# Patient Record
Sex: FEMALE | Race: BLACK OR AFRICAN AMERICAN | NOT HISPANIC OR LATINO | Employment: UNEMPLOYED | URBAN - METROPOLITAN AREA
[De-identification: names, ages, dates, MRNs, and addresses within clinical notes are randomized per-mention and may not be internally consistent; named-entity substitution may affect disease eponyms.]

---

## 2018-02-10 PROCEDURE — 81025 URINE PREGNANCY TEST: CPT | Performed by: EMERGENCY MEDICINE

## 2018-02-10 PROCEDURE — 99283 EMERGENCY DEPT VISIT LOW MDM: CPT | Mod: 25

## 2018-02-10 PROCEDURE — 82962 GLUCOSE BLOOD TEST: CPT

## 2018-02-11 ENCOUNTER — HOSPITAL ENCOUNTER (EMERGENCY)
Facility: HOSPITAL | Age: 28
Discharge: HOME | End: 2018-02-11
Attending: EMERGENCY MEDICINE
Payer: MEDICAID

## 2018-02-11 VITALS
BODY MASS INDEX: 43.87 KG/M2 | RESPIRATION RATE: 18 BRPM | OXYGEN SATURATION: 100 % | WEIGHT: 273 LBS | TEMPERATURE: 98 F | HEIGHT: 66 IN | HEART RATE: 78 BPM | SYSTOLIC BLOOD PRESSURE: 126 MMHG | DIASTOLIC BLOOD PRESSURE: 68 MMHG

## 2018-02-11 DIAGNOSIS — G57.93 NEUROPATHY OF BOTH FEET: Primary | ICD-10-CM

## 2018-02-11 LAB
B-HCG UR QL: NEGATIVE
CTP QC/QA: YES
POCT GLUCOSE: 100 MG/DL (ref 70–110)

## 2018-02-11 RX ORDER — ETODOLAC 400 MG/1
400 TABLET, FILM COATED ORAL 2 TIMES DAILY
COMMUNITY
End: 2019-09-05

## 2018-02-11 NOTE — ED TRIAGE NOTES
Numbness and tingling x 1 day to bilateral feet.  Was seen in urgent care in [facility] and was discharged home.  Pulses present bilaterally with no edema noted.  Now weakness noted at this time.  Denies any trauma to feet or history legs.  No acute distress noted at this time.

## 2018-02-11 NOTE — DISCHARGE INSTRUCTIONS
Your symptoms are consistent with peripheral neuropathy of your feet.  Your blood sugar was normal in the emergency room so it is unlikely that this is due to diabetes.  Because this is more likely ER recent increased standing at work.  Use the medications that he were prescribed at your other emergency room visits to manage her symptoms.  Rest and elevate your legs.  It may take some time for his symptoms to improve.  Return to emergency room for worsening numbness, weakness with movement of her legs, severe back pain or any new or worsening symptoms.  It is very important to make an appointment to see her primary physician as soon as possible to monitor your symptoms and do a general checkup.

## 2018-02-11 NOTE — ED PROVIDER NOTES
Encounter Date: 2/10/2018    SCRIBE #1 NOTE: I, Johnie Ortiz LEROY, am scribing for, and in the presence of,  Sherine Soliz MD. I have scribed the following portions of the note - Other sections scribed: HPI, ROS, PE.       History     Chief Complaint   Patient presents with    Numbness     went to [facility] urgent care today because her feet are swollen and they get numb when increase in swelling; + pedal pulses, +1 edema bilateral lower extremities     CC: Numbness     HPI: This 27 y.o. female with gallstones presents to the ED c/o acute onset, numbness with swellingx3 days.  Patient denies specific injury, but does report doing a lot of standing at her job.  Pt reports she went to [facility]gent [facility] p[facility] and states she was given anti inflammatory medication. Pt reports she was dx as borderline diabetic one month ago. Pt denies prior tx. No alleviating or exacerbating factors. Pt denies weakness, back pain, dysuria, and myalgias.         The history is provided by the patient. No  was used.     Review of patient's allergies indicates:  No Known Allergies  Past Medical History:   Diagnosis Date    Gall stones      Past Surgical History:   Procedure Laterality Date    CHOLECYSTECTOMY       Family History   Problem Relation Age of Onset    Diabetes Paternal Grandfather     Hypertension Paternal Grandfather      Social History   Substance Use Topics    Smoking status: Never Smoker    Smokeless tobacco: Never Used    Alcohol use Yes      Comment: Occassionally       Review of Systems   Constitutional: Negative for activity change, appetite change, chills, diaphoresis and fever.   HENT: Negative for congestion, drooling, ear pain, mouth sores, rhinorrhea, sinus pain, sore throat and trouble swallowing.    Eyes: Negative for pain and discharge.   Respiratory: Negative for cough, chest tightness, shortness of breath, wheezing and stridor.    Cardiovascular: Negative for chest pain, palpitations and leg  swelling.   Gastrointestinal: Negative for abdominal distention, abdominal pain, blood in stool, constipation, diarrhea, nausea and vomiting.   Genitourinary: Positive for frequency. Negative for difficulty urinating, dysuria, flank pain, hematuria and urgency.   Musculoskeletal: Negative for arthralgias, back pain and myalgias.   Skin: Negative for pallor, rash and wound.   Neurological: Positive for numbness. Negative for dizziness, syncope, weakness and light-headedness.   All other systems reviewed and are negative.      Physical Exam     Initial Vitals [02/10/18 2354]   BP Pulse Resp Temp SpO2   122/71 65 16 98.1 °F (36.7 °C) 99 %      MAP       88         Physical Exam    Nursing note and vitals reviewed.  Constitutional: She appears well-developed and well-nourished. She is cooperative.  Non-toxic appearance. No distress.   HENT:   Head: Normocephalic and atraumatic.   Mouth/Throat: Oropharynx is clear and moist.   Eyes: Conjunctivae and EOM are normal. Pupils are equal, round, and reactive to light.   Neck: Normal range of motion and full passive range of motion without pain. Neck supple. No thyromegaly present. No JVD present.   Cardiovascular: Normal rate, regular rhythm, normal heart sounds and normal pulses.   Strong palpable pulses   Pulmonary/Chest: Effort normal and breath sounds normal. No respiratory distress.   Abdominal: Soft. Normal appearance and bowel sounds are normal. She exhibits no distension and no mass. There is no tenderness.   Musculoskeletal: Normal range of motion.   5/5 EHL, FHL, TAGSC   Neurological: She is alert and oriented to person, place, and time. She has normal strength. No cranial nerve deficit or sensory deficit.   Sensation intact to light touch throughout   Skin: Skin is warm, dry and intact. No rash noted. No erythema.   Psychiatric: She has a normal mood and affect. Her speech is normal and behavior is normal. Judgment and thought content normal.         ED Course    Procedures  Labs Reviewed   POCT URINE PREGNANCY   POCT GLUCOSE             Medical Decision Making:   Initial Assessment:   Bilateral foot swelling and tingling in the setting increased activity and prolonged standing  Differential Diagnosis:   Neuropathy  Peripheral edema  Muscle strain  Radicular pain  Low clinical suspicion of DVT given lack of unilateral swelling or calf tenderness.  ED Management:  Hent afebrile, no acute distress.  Bilateral feet are minimally swollen, there is no pitting edema.  Patient has strongly palpable DP and PT pulses.  Patient has no neurological deficits and her sensation is intact to light touch throughout.  Patient reports being evaluated for the same symptoms just prior to arrival and received new prescription for anti-inflammatory medication which she has not yet tried.  Discussed extensively with patient and her mother concerning symptoms for which return to ER for and the necessity of trauma course of anti-inflammatory medication and activity modification to determine the need for operative intervention.  No evidence of cellulitis or fracture.  She is stable for discharge to follow up with primary physician.            Scribe Attestation:   Scribe #1: I performed the above scribed service and the documentation accurately describes the services I performed. I attest to the accuracy of the note.    Attending Attestation:           Physician Attestation for Scribe:  Physician Attestation Statement for Scribe #1: I, Sherine Soliz MD, reviewed documentation, as scribed by Johnie Ortiz II in my presence, and it is both accurate and complete.                    Clinical Impression:   The encounter diagnosis was Neuropathy of both feet.    Disposition:   Disposition: Discharged  Condition: Stable                        Sherine Sloiz MD  02/17/18 0229

## 2019-09-05 PROBLEM — G35 MS (MULTIPLE SCLEROSIS): Status: ACTIVE | Noted: 2019-07-01

## 2019-09-05 PROBLEM — S42.009A BROKEN CLAVICLE: Status: ACTIVE | Noted: 2019-09-05

## 2019-09-25 PROBLEM — B00.9 HERPES SIMPLEX VIRUS (HSV) INFECTION: Status: ACTIVE | Noted: 2019-09-25

## 2020-03-31 ENCOUNTER — ANESTHESIA (OUTPATIENT)
Dept: OBSTETRICS AND GYNECOLOGY | Facility: HOSPITAL | Age: 30
End: 2020-03-31
Payer: MEDICAID

## 2020-03-31 ENCOUNTER — ANESTHESIA EVENT (OUTPATIENT)
Dept: OBSTETRICS AND GYNECOLOGY | Facility: HOSPITAL | Age: 30
End: 2020-03-31
Payer: MEDICAID

## 2020-03-31 ENCOUNTER — HOSPITAL ENCOUNTER (INPATIENT)
Facility: HOSPITAL | Age: 30
LOS: 2 days | Discharge: HOME | End: 2020-04-02
Attending: OBSTETRICS & GYNECOLOGY | Admitting: OBSTETRICS & GYNECOLOGY
Payer: MEDICAID

## 2020-03-31 DIAGNOSIS — B00.9 HERPES SIMPLEX VIRUS (HSV) INFECTION: ICD-10-CM

## 2020-03-31 DIAGNOSIS — G35 MS (MULTIPLE SCLEROSIS): ICD-10-CM

## 2020-03-31 DIAGNOSIS — Z34.90 PREGNANCY: Primary | ICD-10-CM

## 2020-03-31 DIAGNOSIS — O99.820 GBS (GROUP B STREPTOCOCCUS CARRIER), +RV CULTURE, CURRENTLY PREGNANT: ICD-10-CM

## 2020-03-31 PROBLEM — S42.009A BROKEN CLAVICLE: Status: RESOLVED | Noted: 2019-09-05 | Resolved: 2020-03-31

## 2020-03-31 LAB
ABO + RH BLD: NORMAL
ALBUMIN SERPL BCP-MCNC: 2.6 G/DL (ref 3.5–5.2)
ALP SERPL-CCNC: 164 U/L (ref 55–135)
ALT SERPL W/O P-5'-P-CCNC: 11 U/L (ref 10–44)
ANION GAP SERPL CALC-SCNC: 16 MMOL/L (ref 8–16)
AST SERPL-CCNC: 22 U/L (ref 10–40)
BASOPHILS # BLD AUTO: 0.01 K/UL (ref 0–0.2)
BASOPHILS NFR BLD: 0.1 % (ref 0–1.9)
BILIRUB SERPL-MCNC: 0.9 MG/DL (ref 0.1–1)
BLD GP AB SCN CELLS X3 SERPL QL: NORMAL
BUN SERPL-MCNC: 4 MG/DL (ref 6–20)
CALCIUM SERPL-MCNC: 8.2 MG/DL (ref 8.7–10.5)
CHLORIDE SERPL-SCNC: 107 MMOL/L (ref 95–110)
CO2 SERPL-SCNC: 15 MMOL/L (ref 23–29)
CREAT SERPL-MCNC: 0.7 MG/DL (ref 0.5–1.4)
DIFFERENTIAL METHOD: ABNORMAL
EOSINOPHIL # BLD AUTO: 0 K/UL (ref 0–0.5)
EOSINOPHIL NFR BLD: 0 % (ref 0–8)
ERYTHROCYTE [DISTWIDTH] IN BLOOD BY AUTOMATED COUNT: 16.2 % (ref 11.5–14.5)
EST. GFR  (AFRICAN AMERICAN): >60 ML/MIN/1.73 M^2
EST. GFR  (NON AFRICAN AMERICAN): >60 ML/MIN/1.73 M^2
GLUCOSE SERPL-MCNC: 95 MG/DL (ref 70–110)
HCT VFR BLD AUTO: 34.3 % (ref 37–48.5)
HGB BLD-MCNC: 10.5 G/DL (ref 12–16)
IMM GRANULOCYTES # BLD AUTO: 0.05 K/UL (ref 0–0.04)
IMM GRANULOCYTES NFR BLD AUTO: 0.7 % (ref 0–0.5)
INFLUENZA A, MOLECULAR: NEGATIVE
INFLUENZA B, MOLECULAR: NEGATIVE
LACTATE SERPL-SCNC: 1.4 MMOL/L (ref 0.5–2.2)
LYMPHOCYTES # BLD AUTO: 1.5 K/UL (ref 1–4.8)
LYMPHOCYTES NFR BLD: 20.1 % (ref 18–48)
MAGNESIUM SERPL-MCNC: 1.5 MG/DL (ref 1.6–2.6)
MCH RBC QN AUTO: 24.4 PG (ref 27–31)
MCHC RBC AUTO-ENTMCNC: 30.6 G/DL (ref 32–36)
MCV RBC AUTO: 80 FL (ref 82–98)
MONOCYTES # BLD AUTO: 0.7 K/UL (ref 0.3–1)
MONOCYTES NFR BLD: 9.3 % (ref 4–15)
NEUTROPHILS # BLD AUTO: 5.2 K/UL (ref 1.8–7.7)
NEUTROPHILS NFR BLD: 69.8 % (ref 38–73)
NRBC BLD-RTO: 0 /100 WBC
PHOSPHATE SERPL-MCNC: 3.1 MG/DL (ref 2.7–4.5)
PLATELET # BLD AUTO: 218 K/UL (ref 150–350)
PMV BLD AUTO: 10.4 FL (ref 9.2–12.9)
POTASSIUM SERPL-SCNC: 3.4 MMOL/L (ref 3.5–5.1)
PROCALCITONIN SERPL IA-MCNC: 0.09 NG/ML
PROT SERPL-MCNC: 7 G/DL (ref 6–8.4)
RBC # BLD AUTO: 4.31 M/UL (ref 4–5.4)
SODIUM SERPL-SCNC: 138 MMOL/L (ref 136–145)
SPECIMEN SOURCE: NORMAL
WBC # BLD AUTO: 7.51 K/UL (ref 3.9–12.7)

## 2020-03-31 PROCEDURE — 72100002 HC LABOR CARE, 1ST 8 HOURS

## 2020-03-31 PROCEDURE — 62326 NJX INTERLAMINAR LMBR/SAC: CPT | Performed by: ANESTHESIOLOGY

## 2020-03-31 PROCEDURE — 86901 BLOOD TYPING SEROLOGIC RH(D): CPT

## 2020-03-31 PROCEDURE — 99285 EMERGENCY DEPT VISIT HI MDM: CPT | Mod: 25,27

## 2020-03-31 PROCEDURE — C1751 CATH, INF, PER/CENT/MIDLINE: HCPCS | Performed by: ANESTHESIOLOGY

## 2020-03-31 PROCEDURE — 11000001 HC ACUTE MED/SURG PRIVATE ROOM

## 2020-03-31 PROCEDURE — 84100 ASSAY OF PHOSPHORUS: CPT

## 2020-03-31 PROCEDURE — 63600175 PHARM REV CODE 636 W HCPCS: Performed by: OBSTETRICS & GYNECOLOGY

## 2020-03-31 PROCEDURE — 83605 ASSAY OF LACTIC ACID: CPT

## 2020-03-31 PROCEDURE — 36415 COLL VENOUS BLD VENIPUNCTURE: CPT

## 2020-03-31 PROCEDURE — 80053 COMPREHEN METABOLIC PANEL: CPT

## 2020-03-31 PROCEDURE — 83735 ASSAY OF MAGNESIUM: CPT

## 2020-03-31 PROCEDURE — 27200710 HC EPIDURAL INFUSION PUMP SET: Performed by: ANESTHESIOLOGY

## 2020-03-31 PROCEDURE — 87502 INFLUENZA DNA AMP PROBE: CPT

## 2020-03-31 PROCEDURE — 51701 INSERT BLADDER CATHETER: CPT

## 2020-03-31 PROCEDURE — 85025 COMPLETE CBC W/AUTO DIFF WBC: CPT

## 2020-03-31 PROCEDURE — 25000003 PHARM REV CODE 250: Performed by: OBSTETRICS & GYNECOLOGY

## 2020-03-31 PROCEDURE — 51702 INSERT TEMP BLADDER CATH: CPT

## 2020-03-31 PROCEDURE — 59409 PRA ETRICAL CARE,VAG DELIV ONLY: ICD-10-PCS | Mod: AA,,, | Performed by: ANESTHESIOLOGY

## 2020-03-31 PROCEDURE — 25000003 PHARM REV CODE 250: Performed by: ANESTHESIOLOGY

## 2020-03-31 PROCEDURE — U0002 COVID-19 LAB TEST NON-CDC: HCPCS

## 2020-03-31 PROCEDURE — 72200004 HC VAGINAL DELIVERY LEVEL I

## 2020-03-31 PROCEDURE — 99211 OFF/OP EST MAY X REQ PHY/QHP: CPT | Mod: 25

## 2020-03-31 PROCEDURE — S0020 INJECTION, BUPIVICAINE HYDRO: HCPCS | Performed by: ANESTHESIOLOGY

## 2020-03-31 PROCEDURE — 59409 OBSTETRICAL CARE: CPT | Mod: AA,,, | Performed by: ANESTHESIOLOGY

## 2020-03-31 PROCEDURE — 84145 PROCALCITONIN (PCT): CPT

## 2020-03-31 PROCEDURE — 87040 BLOOD CULTURE FOR BACTERIA: CPT

## 2020-03-31 PROCEDURE — 86592 SYPHILIS TEST NON-TREP QUAL: CPT

## 2020-03-31 RX ORDER — ONDANSETRON 8 MG/1
8 TABLET, ORALLY DISINTEGRATING ORAL EVERY 8 HOURS PRN
Status: DISCONTINUED | OUTPATIENT
Start: 2020-03-31 | End: 2020-04-02 | Stop reason: HOSPADM

## 2020-03-31 RX ORDER — FENTANYL/BUPIVACAINE/NS/PF 2MCG/ML-.1
PLASTIC BAG, INJECTION (ML) INJECTION CONTINUOUS PRN
Status: DISCONTINUED | OUTPATIENT
Start: 2020-03-31 | End: 2020-03-31

## 2020-03-31 RX ORDER — DOCUSATE SODIUM 100 MG/1
200 CAPSULE, LIQUID FILLED ORAL 2 TIMES DAILY PRN
Status: DISCONTINUED | OUTPATIENT
Start: 2020-03-31 | End: 2020-04-02 | Stop reason: HOSPADM

## 2020-03-31 RX ORDER — ACETAMINOPHEN 325 MG/1
650 TABLET ORAL EVERY 6 HOURS PRN
Status: DISCONTINUED | OUTPATIENT
Start: 2020-03-31 | End: 2020-03-31

## 2020-03-31 RX ORDER — SIMETHICONE 80 MG
1 TABLET,CHEWABLE ORAL EVERY 6 HOURS PRN
Status: DISCONTINUED | OUTPATIENT
Start: 2020-03-31 | End: 2020-04-02 | Stop reason: HOSPADM

## 2020-03-31 RX ORDER — HYDROCODONE BITARTRATE AND ACETAMINOPHEN 10; 325 MG/1; MG/1
1 TABLET ORAL EVERY 4 HOURS PRN
Status: DISCONTINUED | OUTPATIENT
Start: 2020-03-31 | End: 2020-04-02 | Stop reason: HOSPADM

## 2020-03-31 RX ORDER — ACETAMINOPHEN 325 MG/1
650 TABLET ORAL EVERY 6 HOURS PRN
Status: DISCONTINUED | OUTPATIENT
Start: 2020-03-31 | End: 2020-04-02 | Stop reason: HOSPADM

## 2020-03-31 RX ORDER — ONDANSETRON 8 MG/1
8 TABLET, ORALLY DISINTEGRATING ORAL EVERY 8 HOURS PRN
Status: DISCONTINUED | OUTPATIENT
Start: 2020-03-31 | End: 2020-03-31 | Stop reason: SDUPTHER

## 2020-03-31 RX ORDER — DIPHENHYDRAMINE HCL 25 MG
25 CAPSULE ORAL EVERY 4 HOURS PRN
Status: DISCONTINUED | OUTPATIENT
Start: 2020-03-31 | End: 2020-04-02 | Stop reason: HOSPADM

## 2020-03-31 RX ORDER — DIPHENHYDRAMINE HYDROCHLORIDE 50 MG/ML
25 INJECTION INTRAMUSCULAR; INTRAVENOUS EVERY 4 HOURS PRN
Status: DISCONTINUED | OUTPATIENT
Start: 2020-03-31 | End: 2020-04-02 | Stop reason: HOSPADM

## 2020-03-31 RX ORDER — GUAIFENESIN 600 MG/1
600 TABLET, EXTENDED RELEASE ORAL 2 TIMES DAILY
Status: DISCONTINUED | OUTPATIENT
Start: 2020-03-31 | End: 2020-03-31

## 2020-03-31 RX ORDER — OXYTOCIN/RINGER'S LACTATE 30/500 ML
334 PLASTIC BAG, INJECTION (ML) INTRAVENOUS ONCE
Status: DISCONTINUED | OUTPATIENT
Start: 2020-03-31 | End: 2020-04-02 | Stop reason: HOSPADM

## 2020-03-31 RX ORDER — HYDROCORTISONE 25 MG/G
CREAM TOPICAL 3 TIMES DAILY PRN
Status: DISCONTINUED | OUTPATIENT
Start: 2020-03-31 | End: 2020-04-02 | Stop reason: HOSPADM

## 2020-03-31 RX ORDER — PRENATAL WITH FERROUS FUM AND FOLIC ACID 3080; 920; 120; 400; 22; 1.84; 3; 20; 10; 1; 12; 200; 27; 25; 2 [IU]/1; [IU]/1; MG/1; [IU]/1; MG/1; MG/1; MG/1; MG/1; MG/1; MG/1; UG/1; MG/1; MG/1; MG/1; MG/1
1 TABLET ORAL DAILY
Status: DISCONTINUED | OUTPATIENT
Start: 2020-04-01 | End: 2020-04-02 | Stop reason: HOSPADM

## 2020-03-31 RX ORDER — BUPIVACAINE HYDROCHLORIDE 2.5 MG/ML
INJECTION, SOLUTION INFILTRATION; PERINEURAL
Status: DISCONTINUED | OUTPATIENT
Start: 2020-03-31 | End: 2020-03-31

## 2020-03-31 RX ORDER — OXYTOCIN/RINGER'S LACTATE 30/500 ML
95 PLASTIC BAG, INJECTION (ML) INTRAVENOUS ONCE
Status: DISCONTINUED | OUTPATIENT
Start: 2020-03-31 | End: 2020-04-02 | Stop reason: HOSPADM

## 2020-03-31 RX ORDER — ALBUTEROL SULFATE 90 UG/1
2 AEROSOL, METERED RESPIRATORY (INHALATION) 4 TIMES DAILY
Status: DISCONTINUED | OUTPATIENT
Start: 2020-03-31 | End: 2020-03-31

## 2020-03-31 RX ORDER — GUAIFENESIN 600 MG/1
600 TABLET, EXTENDED RELEASE ORAL 2 TIMES DAILY
Status: DISCONTINUED | OUTPATIENT
Start: 2020-03-31 | End: 2020-04-02 | Stop reason: HOSPADM

## 2020-03-31 RX ORDER — IBUPROFEN 600 MG/1
600 TABLET ORAL EVERY 6 HOURS PRN
Status: DISCONTINUED | OUTPATIENT
Start: 2020-03-31 | End: 2020-04-02 | Stop reason: HOSPADM

## 2020-03-31 RX ORDER — SODIUM CHLORIDE, SODIUM LACTATE, POTASSIUM CHLORIDE, CALCIUM CHLORIDE 600; 310; 30; 20 MG/100ML; MG/100ML; MG/100ML; MG/100ML
INJECTION, SOLUTION INTRAVENOUS CONTINUOUS
Status: DISCONTINUED | OUTPATIENT
Start: 2020-03-31 | End: 2020-03-31

## 2020-03-31 RX ORDER — HYDROCODONE BITARTRATE AND ACETAMINOPHEN 5; 325 MG/1; MG/1
1 TABLET ORAL EVERY 4 HOURS PRN
Status: DISCONTINUED | OUTPATIENT
Start: 2020-03-31 | End: 2020-04-02 | Stop reason: HOSPADM

## 2020-03-31 RX ORDER — ALBUTEROL SULFATE 90 UG/1
2 AEROSOL, METERED RESPIRATORY (INHALATION) EVERY 4 HOURS PRN
Status: DISCONTINUED | OUTPATIENT
Start: 2020-03-31 | End: 2020-04-02 | Stop reason: HOSPADM

## 2020-03-31 RX ORDER — SODIUM CHLORIDE 9 MG/ML
INJECTION, SOLUTION INTRAVENOUS
Status: DISCONTINUED | OUTPATIENT
Start: 2020-03-31 | End: 2020-03-31

## 2020-03-31 RX ADMIN — AMPICILLIN SODIUM 1 G: 1 INJECTION, POWDER, FOR SOLUTION INTRAMUSCULAR; INTRAVENOUS at 02:03

## 2020-03-31 RX ADMIN — SODIUM CHLORIDE, SODIUM LACTATE, POTASSIUM CHLORIDE, AND CALCIUM CHLORIDE 1000 ML: .6; .31; .03; .02 INJECTION, SOLUTION INTRAVENOUS at 09:03

## 2020-03-31 RX ADMIN — GUAIFENESIN 600 MG: 600 TABLET, EXTENDED RELEASE ORAL at 04:03

## 2020-03-31 RX ADMIN — BUPIVACAINE HYDROCHLORIDE 6 ML: 2.5 INJECTION, SOLUTION INFILTRATION; PERINEURAL at 10:03

## 2020-03-31 RX ADMIN — HYDROCODONE BITARTRATE AND ACETAMINOPHEN 1 TABLET: 5; 325 TABLET ORAL at 04:03

## 2020-03-31 RX ADMIN — SODIUM CHLORIDE, SODIUM LACTATE, POTASSIUM CHLORIDE, AND CALCIUM CHLORIDE: .6; .31; .03; .02 INJECTION, SOLUTION INTRAVENOUS at 10:03

## 2020-03-31 RX ADMIN — Medication 10 ML/HR: at 10:03

## 2020-03-31 RX ADMIN — AMPICILLIN SODIUM 2 G: 2 INJECTION, POWDER, FOR SOLUTION INTRAMUSCULAR; INTRAVENOUS at 09:03

## 2020-03-31 NOTE — TREATMENT PLAN
1830 S Frank RN charge nurse called and left message with pulmonology, regarding consult    1845 At bedside. In and out cath completed (see output flowsheet). pericare completed. Underwear and pad applied. Green pads changed on bed . Pt assisted back to bed. Made aware of poc to transfer to mother baby room once baby's bath is completed. Verbal recall.

## 2020-03-31 NOTE — ANESTHESIA PROCEDURE NOTES
Epidural    Patient location during procedure: OB   Reason for block: primary anesthetic   Diagnosis: IUP   Start time: 3/31/2020 10:31 AM  Timeout: 3/31/2020 10:30 AM  End time: 3/31/2020 10:48 AM    Staffing  Performing Provider: Daniel Shin MD  Authorizing Provider: Daniel Shin MD        Preanesthetic Checklist  Completed: patient identified, site marked, pre-op evaluation, timeout performed, IV checked, risks and benefits discussed, monitors and equipment checked, anesthesia consent given, hand hygiene performed and patient being monitored  Preparation  Patient position: sitting  Prep: ChloraPrep  Patient monitoring: Pulse Ox and Blood Pressure  Epidural  Skin Anesthetic: lidocaine 1%  Skin Wheal: 3 mL  Administration type: single shot  Approach: midline  Interspace: L3-4    Injection technique: REG air  Needle and Epidural Catheter  Needle type: Tuohy   Needle gauge: 17  Needle length: 3.5 inches  Needle insertion depth: 7 cm  Catheter type: end hole and springwound  Catheter size: 19 G  Catheter at skin depth: 12 cm  Test dose: 3 mL of lidocaine 1.5% with Epi 1-to-200,000  Additional Documentation: incremental injection, negative aspiration for heme and CSF, no paresthesia on injection, no significant complaints from patient, no significant pain on injection and no signs/symptoms of IV or SA injection  Needle localization: anatomical landmarks  Medications:  Volume per aspiration: 3 mL  Time between aspirations: 3 minutes  Assessment  Upper dermatomal levels - Left: T10  Right: T10   Dermatomal levels determined by pinch or prick  Ease of block: moderate  Patient's tolerance of the procedure: comfortable throughout block and no complaintsNo inadvertent dural puncture with Tuohy.  Dural puncture not performed with spinal needle

## 2020-03-31 NOTE — OP NOTE
Delivery Note    Obstetrician: Blayne    Pre-Delivery Diagnosis: Term pregnancy, Spontaneous labor, Single fetus, Pregnancy complicated by: last baby w/ broken clavicle @ delivery but pt denies shoulder dystocia/ h/o hsv but no evidence current outbreak/ gbs carrier/ stable ms    Post-Delivery Diagnosis: Living  infant female    Procedure: Spontaneous vaginal delivery     Episiotomy or Incision: none    Indications for instrumental delivery: none    Infant Wt: Pending     Apgars: 1' 9  /  5' 9     Placenta and Cord: Spontaneous and intact    Estimated Blood Loss:  200 mL           Complications: Compound presentation w/ left hand and arm          Condition: Mom and baby in good condition

## 2020-03-31 NOTE — H&P
`History and Physical  Obstetrics      SUBJECTIVE:     Mesha Mario is a 29 y.o.  female at 38w4d  admitted for labor management.  Her current obstetrical history is significant for last baby with broken clavicle @ delivery but pt denies shoulder dystocia/ h/o hsv but no evidence current outbreak/ gbs carrier/ stable ms.      PTA Medications   Medication Sig    prenatal vit103-iron fum-folic () 27 mg iron- 1 mg Tab Take 1 tablet by mouth once daily.    promethazine (PHENERGAN) 25 MG tablet take 1 TABLET(S) BY MOUTH EVERY 12 hours AS NEEDED for NAUSEA AND VOMITING       Review of patient's allergies indicates:  No Known Allergies     Past Medical History:   Diagnosis Date    Herpes simplex virus (HSV) infection     Multiple sclerosis      Past Surgical History:   Procedure Laterality Date    CHOLECYSTECTOMY       Family History   Problem Relation Age of Onset    Breast cancer Neg Hx     Colon cancer Neg Hx     Ovarian cancer Neg Hx      Social History     Tobacco Use    Smoking status: Never Smoker    Smokeless tobacco: Never Used   Substance Use Topics    Alcohol use: Not Currently    Drug use: No        OBJECTIVE:     Vital Signs (Most Recent)  Temp: 98.3 °F (36.8 °C) (20 08)  Pulse: 95 (20 0957)  Resp: 16 (20 08)  BP: 130/68 (20 0829)  SpO2: 100 % (20 0956)    Physical Exam:  General:  Normal, alert and oriented                       Abdomen: Soft gravid no FT   Uterine Size:  c/w dates   Presentations:  vertex   FHT: reviewed   Pelvis: NEFG   Cervix: arom clear fluid    Dilation: 4 cm    Effacement: 75%    Station:  -2               Laboratory:  No results for input(s): ABORH, HEPBSAG, RUBELLAIGGSC, GBS, AFP, BLBAWSD3DS in the last 168 hours.   ASSESSMENT/PLAN:     38w4d gestation/ active labor/ last baby with broken clavicle @ delivery but pt denies shoulder dystocia/ h/o hsv but no evidence current outbreak/ gbs carrier/ stable ms  Admit/ amp  prophylaxis/ anesthesia consult

## 2020-03-31 NOTE — TREATMENT PLAN
1530 Pt's temperature post delivery was 102.8. And pt now coughing. Dr Cisneros made aware. New orders given.    1615 at bedside. pt made aware that she is now suspected COVID 19 and will be swabbed for influenza and COVID. Pt states she was swabbed on Saturday in [facility] but has not gotten her results yet.

## 2020-03-31 NOTE — PROGRESS NOTES
Delivery Progress Note  Obstetrics        SUBJECTIVE:     Postpartum Day 0    Ms. Mario now reports cough for four days prior to admission and just notified by nursing pt temp 102.8.     OBJECTIVE:     Vital Signs (Most Recent):  Temp: (!) 102.8 °F (39.3 °C) (20 1430)  Pulse: 110 (20 1500)  Resp: 16 (20 1430)  BP: (!) 141/67 (20 1500)  SpO2: 100 % (20 1500)    Vital Signs Range (Last 24H):  Temp:  [98.3 °F (36.8 °C)-102.8 °F (39.3 °C)]   Pulse:  []   Resp:  [16-18]   BP: (104-141)/(54-68)   SpO2:  [94 %-100 %]     I & O (Last 24H):No intake or output data in the 24 hours ending 20 1600  Physical Exam:  General:    no distress   Lungs:  clear to auscultation bilaterally   Heart:  regular rate and rhythm       Abdomen:  soft, non-tender; bowel sounds normal   Fundus:  firm       Lochia:   moderate rubra   DVT Evaluation:  No evidence of DVT on either side seen on physical exam.     Hemoglobin/Hematocrit  Recent Labs   Lab 20  0927   HGB 10.5*   HCT 34.3*     ABO/Rh  Lab Results   Component Value Date    GROUPTRH A POS 2020     Rubella  No results for input(s): RUBELLAIGGSC in the last 168 hours.    ASSESSMENT/PLAN:     Status post / fever/ cough/ stable ms    Pulse ox/ Rapid flu/ COVID-19 PCR/ isolation status/ cbc/ cmp/ mg/ phos/ procalcitonin/ lactic acid/ blood cultures/ CXR/ Albuterol mdi/ tylenol/ guaifenesin/ monitor respiratory status closely

## 2020-03-31 NOTE — PLAN OF CARE
Pt would like epidural during labor. Significant other, Tarun Saleem for support. Expecting a girl baby, Martinez for Dr Hawkins.

## 2020-03-31 NOTE — ANESTHESIA PREPROCEDURE EVALUATION
03/31/2020  Mesha Mario is a 29 y.o., female.    Anesthesia Evaluation    I have reviewed the Patient Summary Reports.    I have reviewed the Nursing Notes.      Review of Systems  Anesthesia Hx:  No problems with previous Anesthesia  Denies Family Hx of Anesthesia complications.   Denies Personal Hx of Anesthesia complications.   Social:  Non-Smoker, No Alcohol Use    Hematology/Oncology:         -- Anemia: Hematology Comments: No bleeding disorder   Cardiovascular:   Exercise tolerance: good Denies Hypertension.  Denies Dysrhythmias.   Denies Angina.    Pulmonary:  Pulmonary Normal    Renal/:  Renal/ Normal     Hepatic/GI:  Hepatic/GI Normal    OB/GYN/PEDS:  IUP   Neurological:   Multiple sclerosis; dx ~3 yrs ago; pt currently not on any meds; no h/o exacerbation; no current symptoms   Endocrine:  Endocrine Normal        Physical Exam  General:  Well nourished, Obesity    Airway/Jaw/Neck:  Airway Findings: Mouth Opening: Normal Tongue: Normal  Mallampati: III  TM Distance: 4 - 6 cm     Eyes/Ears/Nose:  Eyes/Ears/Nose Findings:    Dental:  Dental Findings: In tact    Chest/Lungs:  Chest/Lungs Findings: Normal Respiratory Rate     Heart/Vascular:  Heart Findings: Rate: Normal        Mental Status:  Mental Status Findings:  Cooperative, Alert and Oriented       Wt Readings from Last 3 Encounters:   03/31/20 122.9 kg (271 lb)   12/12/19 121.6 kg (268 lb)   11/21/19 119.7 kg (264 lb)     Temp Readings from Last 3 Encounters:   03/31/20 36.8 °C (98.3 °F) (Oral)   02/11/18 36.8 °C (98.2 °F) (Oral)   04/22/15 36.9 °C (98.4 °F) (Oral)     BP Readings from Last 3 Encounters:   03/31/20 (!) 104/54   12/12/19 112/66   11/21/19 120/67     Pulse Readings from Last 3 Encounters:   03/31/20 108   05/02/19 72   03/29/19 64     Lab Results   Component Value Date    WBC 7.51 03/31/2020    HGB 10.5 (L) 03/31/2020     HCT 34.3 (L) 03/31/2020    MCV 80 (L) 03/31/2020     03/31/2020           Anesthesia Plan  Type of Anesthesia, risks & benefits discussed:  Anesthesia Type:  epidural  Patient's Preference:   Intra-op Monitoring Plan: standard ASA monitors  Intra-op Monitoring Plan Comments:   Post Op Pain Control Plan: multimodal analgesia and per primary service following discharge from PACU  Post Op Pain Control Plan Comments:   Induction:    Beta Blocker:  Patient is not currently on a Beta-Blocker (No further documentation required).       Informed Consent: Patient understands risks and agrees with Anesthesia plan.  Questions answered. Anesthesia consent signed with patient.  ASA Score: 2     Day of Surgery Review of History & Physical: I have interviewed and examined the patient. I have reviewed the patient's H&P dated:        Anesthesia Plan Notes: D/w patient that although unlikely, neuraxial analgesia may exacerbate or worsen her MS. Pt understands and wishes to continue.         Ready For Surgery From Anesthesia Perspective.

## 2020-04-01 PROBLEM — U07.1 COVID-19 VIRUS INFECTION: Status: ACTIVE | Noted: 2020-04-01

## 2020-04-01 LAB
BASOPHILS # BLD AUTO: 0.01 K/UL (ref 0–0.2)
BASOPHILS NFR BLD: 0.1 % (ref 0–1.9)
DIFFERENTIAL METHOD: ABNORMAL
EOSINOPHIL # BLD AUTO: 0 K/UL (ref 0–0.5)
EOSINOPHIL NFR BLD: 0 % (ref 0–8)
ERYTHROCYTE [DISTWIDTH] IN BLOOD BY AUTOMATED COUNT: 16.4 % (ref 11.5–14.5)
HCT VFR BLD AUTO: 30.8 % (ref 37–48.5)
HGB BLD-MCNC: 9.5 G/DL (ref 12–16)
IMM GRANULOCYTES # BLD AUTO: 0.05 K/UL (ref 0–0.04)
IMM GRANULOCYTES NFR BLD AUTO: 0.5 % (ref 0–0.5)
LYMPHOCYTES # BLD AUTO: 2.6 K/UL (ref 1–4.8)
LYMPHOCYTES NFR BLD: 24.2 % (ref 18–48)
MCH RBC QN AUTO: 24.5 PG (ref 27–31)
MCHC RBC AUTO-ENTMCNC: 30.8 G/DL (ref 32–36)
MCV RBC AUTO: 79 FL (ref 82–98)
MONOCYTES # BLD AUTO: 1.2 K/UL (ref 0.3–1)
MONOCYTES NFR BLD: 11.5 % (ref 4–15)
NEUTROPHILS # BLD AUTO: 6.8 K/UL (ref 1.8–7.7)
NEUTROPHILS NFR BLD: 63.7 % (ref 38–73)
NRBC BLD-RTO: 0 /100 WBC
PLATELET # BLD AUTO: 216 K/UL (ref 150–350)
PMV BLD AUTO: 11.4 FL (ref 9.2–12.9)
RBC # BLD AUTO: 3.88 M/UL (ref 4–5.4)
RPR SER QL: NORMAL
SARS-COV-2 RNA RESP QL NAA+PROBE: DETECTED
WBC # BLD AUTO: 10.73 K/UL (ref 3.9–12.7)

## 2020-04-01 PROCEDURE — 99232 PR SUBSEQUENT HOSPITAL CARE,LEVL II: ICD-10-PCS | Mod: ,,, | Performed by: INTERNAL MEDICINE

## 2020-04-01 PROCEDURE — 11000001 HC ACUTE MED/SURG PRIVATE ROOM

## 2020-04-01 PROCEDURE — 99232 SBSQ HOSP IP/OBS MODERATE 35: CPT | Mod: ,,, | Performed by: INTERNAL MEDICINE

## 2020-04-01 PROCEDURE — 25000003 PHARM REV CODE 250: Performed by: OBSTETRICS & GYNECOLOGY

## 2020-04-01 PROCEDURE — 85025 COMPLETE CBC W/AUTO DIFF WBC: CPT

## 2020-04-01 RX ADMIN — GUAIFENESIN 600 MG: 600 TABLET, EXTENDED RELEASE ORAL at 10:04

## 2020-04-01 RX ADMIN — GUAIFENESIN 600 MG: 600 TABLET, EXTENDED RELEASE ORAL at 08:04

## 2020-04-01 RX ADMIN — ACETAMINOPHEN 650 MG: 325 TABLET ORAL at 12:04

## 2020-04-01 NOTE — CONSULTS
[facility]  Pulmonology  Consult Note    Patient Name: Mesha Mario  MRN: [id number]  Admission Date: 3/31/2020  Hospital Length of Stay: 1 days  Code Status: Full Code  Attending Physician: Elvia Cisneros MD  Primary Care Provider: Primary Doctor No   Principal Problem: <principal problem not specified>    Inpatient consult to Pulmonology  Consult performed by: Oli Omalley MD  Consult ordered by: Elvia Cisneros MD  Reason for consult: COVID        Subjective:     HPI:  29 year female day 1 post delivery who is COVID positive.   Cough times 1 week.   Fever last night.   Reports no SOB.   Some lethargy that seems to have improved.   Denies chest pain.     Past Medical History:   Diagnosis Date    Herpes simplex virus (HSV) infection     Multiple sclerosis        Past Surgical History:   Procedure Laterality Date    CHOLECYSTECTOMY         Review of patient's allergies indicates:  No Known Allergies    Family History     None        Tobacco Use    Smoking status: Never Smoker    Smokeless tobacco: Never Used   Substance and Sexual Activity    Alcohol use: Not Currently    Drug use: No    Sexual activity: Yes     Partners: Male     Birth control/protection: None         Review of Systems   Constitutional: Positive for fatigue. Negative for activity change and appetite change.   HENT: Negative for congestion and rhinorrhea.    Respiratory: Negative for apnea and chest tightness.    Cardiovascular: Negative for chest pain.   Gastrointestinal: Negative for abdominal distention and abdominal pain.   Genitourinary: Negative for difficulty urinating and dyspareunia.   Musculoskeletal: Negative for arthralgias and back pain.   Neurological: Negative for dizziness and facial asymmetry.   Psychiatric/Behavioral: Negative for agitation and behavioral problems.     Objective:     Vital Signs (Most Recent):  Temp: 98.1 °F (36.7 °C) (04/01/20 1100)  Pulse: 83 (04/01/20 0800)  Resp: 16  (04/01/20 0800)  BP: 112/69 (04/01/20 0800)  SpO2: 97 % (04/01/20 0800) Vital Signs (24h Range):  Temp:  [97.3 °F (36.3 °C)-100.8 °F (38.2 °C)] 98.1 °F (36.7 °C)  Pulse:  [] 83  Resp:  [16-22] 16  SpO2:  [95 %-100 %] 97 %  BP: (108-134)/(64-72) 112/69     Weight: 122.9 kg (271 lb)(per pt)  Body mass index is 43.74 kg/m².      Intake/Output Summary (Last 24 hours) at 4/1/2020 1633  Last data filed at 4/1/2020 1100  Gross per 24 hour   Intake 700 ml   Output 1223 ml   Net -523 ml       Physical Exam   Constitutional: She appears well-developed and well-nourished. No distress.   Cardiovascular: Normal rate and regular rhythm.   Pulmonary/Chest: Effort normal and breath sounds normal.   Abdominal: Soft. Bowel sounds are normal.   Musculoskeletal: Normal range of motion. She exhibits no edema or deformity.   Skin: Skin is warm and dry. She is not diaphoretic.   Nursing note and vitals reviewed.      Vents:       Lines/Drains/Airways     Epidural Line                 Neuraxial Analgesia/Anesthesia Assessment (using dermatomes) Epidural 03/31/20 1031 1 day          Peripheral Intravenous Line                 Peripheral IV - Single Lumen 03/31/20 0930 18 G Left;Posterior Hand 1 day                Significant Labs:    CBC/Anemia Profile:  Recent Labs   Lab 03/31/20  0927 04/01/20  0515   WBC 7.51 10.73   HGB 10.5* 9.5*   HCT 34.3* 30.8*    216   MCV 80* 79*   RDW 16.2* 16.4*        Chemistries:  Recent Labs   Lab 03/31/20  1721      K 3.4*      CO2 15*   BUN 4*   CREATININE 0.7   CALCIUM 8.2*   ALBUMIN 2.6*   PROT 7.0   BILITOT 0.9   ALKPHOS 164*   ALT 11   AST 22   MG 1.5*   PHOS 3.1       All pertinent labs within the past 24 hours have been reviewed.    Significant Imaging:   I have reviewed and interpreted all pertinent imaging results/findings within the past 24 hours.    Assessment/Plan:     COVID-19 virus infection  Patient with cough and fever. No other respiratory symptoms outside of cough.  % on room air. Patient does have some mild RLL infiltrate on CXR. Otherwise normal.   -Early mobilization.  -No need for oxygen  -Consider Abx if increase in WBC. No unreasonable to treat with Rocephin and Azithromycin if PNA suspected.           Thank you for your consult. I will sign off. Please contact us if you have any additional questions.     Oli Omalley MD  Pulmonology  [facility]

## 2020-04-01 NOTE — PROGRESS NOTES
Subjective:     No complaints.  Ambulating, voiding, tolerating PO.  Bleeding and pain are controlled.   Last fever last night 100.5 at 23:00. Otherwise, no H/A, change in vision, RUQ/epigastric pain, N/V, CP, or SOB.    Review of Systems  Nine system ROS negative    Objective:   Vitals:  Temp: 98.6 °F (37 °C) (20 0800)  Pulse: 83 (2000)  Resp: 16 (20)  BP: 112/69 (20)  SpO2: 97 % (20)  Temp:  [97.3 °F (36.3 °C)-102.8 °F (39.3 °C)]   Pulse:  []   Resp:  [16-22]   BP: (104-141)/(54-72)   SpO2:  [95 %-100 %]   General: no acute distress, alert and oriented to person, place and time  Breasts: nontender, nonengorged  Abdomen: non-distended, non-tender to palpation, uterus firm and below the umbilicus  Pelvic: deferred, reported minimal uterine bleeding  Extremities: calves non-tender to palpation, no calf edema, erythema or palpable cords    Recent Labs   Lab 20  0515   WBC 10.73   HGB 9.5*   HCT 30.8*        Assessment:     Postpartum Day #1 s/p   COVID rule out    Plan:     Routine postpartum care  Continue isolation

## 2020-04-01 NOTE — SUBJECTIVE & OBJECTIVE
Past Medical History:   Diagnosis Date    Herpes simplex virus (HSV) infection     Multiple sclerosis        Past Surgical History:   Procedure Laterality Date    CHOLECYSTECTOMY         Review of patient's allergies indicates:  No Known Allergies    Family History     None        Tobacco Use    Smoking status: Never Smoker    Smokeless tobacco: Never Used   Substance and Sexual Activity    Alcohol use: Not Currently    Drug use: No    Sexual activity: Yes     Partners: Male     Birth control/protection: None         Review of Systems   Constitutional: Positive for fatigue. Negative for activity change and appetite change.   HENT: Negative for congestion and rhinorrhea.    Respiratory: Negative for apnea and chest tightness.    Cardiovascular: Negative for chest pain.   Gastrointestinal: Negative for abdominal distention and abdominal pain.   Genitourinary: Negative for difficulty urinating and dyspareunia.   Musculoskeletal: Negative for arthralgias and back pain.   Neurological: Negative for dizziness and facial asymmetry.   Psychiatric/Behavioral: Negative for agitation and behavioral problems.     Objective:     Vital Signs (Most Recent):  Temp: 98.1 °F (36.7 °C) (04/01/20 1100)  Pulse: 83 (04/01/20 0800)  Resp: 16 (04/01/20 0800)  BP: 112/69 (04/01/20 0800)  SpO2: 97 % (04/01/20 0800) Vital Signs (24h Range):  Temp:  [97.3 °F (36.3 °C)-100.8 °F (38.2 °C)] 98.1 °F (36.7 °C)  Pulse:  [] 83  Resp:  [16-22] 16  SpO2:  [95 %-100 %] 97 %  BP: (108-134)/(64-72) 112/69     Weight: 122.9 kg (271 lb)(per pt)  Body mass index is 43.74 kg/m².      Intake/Output Summary (Last 24 hours) at 4/1/2020 1633  Last data filed at 4/1/2020 1100  Gross per 24 hour   Intake 700 ml   Output 1223 ml   Net -523 ml       Physical Exam   Constitutional: She appears well-developed and well-nourished. No distress.   Cardiovascular: Normal rate and regular rhythm.   Pulmonary/Chest: Effort normal and breath sounds normal.    Abdominal: Soft. Bowel sounds are normal.   Musculoskeletal: Normal range of motion. She exhibits no edema or deformity.   Skin: Skin is warm and dry. She is not diaphoretic.   Nursing note and vitals reviewed.      Vents:       Lines/Drains/Airways     Epidural Line                 Neuraxial Analgesia/Anesthesia Assessment (using dermatomes) Epidural 03/31/20 1031 1 day          Peripheral Intravenous Line                 Peripheral IV - Single Lumen 03/31/20 0930 18 G Left;Posterior Hand 1 day                Significant Labs:    CBC/Anemia Profile:  Recent Labs   Lab 03/31/20  0927 04/01/20  0515   WBC 7.51 10.73   HGB 10.5* 9.5*   HCT 34.3* 30.8*    216   MCV 80* 79*   RDW 16.2* 16.4*        Chemistries:  Recent Labs   Lab 03/31/20  1721      K 3.4*      CO2 15*   BUN 4*   CREATININE 0.7   CALCIUM 8.2*   ALBUMIN 2.6*   PROT 7.0   BILITOT 0.9   ALKPHOS 164*   ALT 11   AST 22   MG 1.5*   PHOS 3.1       All pertinent labs within the past 24 hours have been reviewed.    Significant Imaging:   I have reviewed and interpreted all pertinent imaging results/findings within the past 24 hours.

## 2020-04-01 NOTE — LACTATION NOTE
This note was copied from a baby's chart.  Formula Feeding Discharge Instructions    Baby is to be fed by the Baby Led bottle feeding method:   Feed on Cue:  o Hunger cues - hands to mouth, bending arms and legs toward the body, sucking noises, puckered lips and rooting/searching for the nipple   Method of feeding the baby:  o always hold the baby upright, never prop a bottle  o brush the nipple across babys upper lip and wait to open  o hold bottle in a flat position, only partly full  o allow baby to pause and take breaks; burp as needed  o feeding lasts about 15 - 20 minutes  o Stop feeding with signs of fullness  o Fullness cues - sucking slows or stops, relaxed hands and arms, pushes away, falls asleep  Preparing Powdered Formula:   Remove plastic lid and wash lid with soap and water, dry and label with date   Clean top of can & open.  Remove scoop.   Follow s instructions on quantity of water and powder   Follow pediatricians recommendation on the type of water to use   Shake well prior to feeding   For pre-mixed formula - Refrigerate and use within 24 hours.  Re-warm individual bottles immediately prior to use.   Formula expires 1 hour after in initiation of the feeding  Preparing Liquid Concentrate Formula:   Follow pediatricians recommendation on the type of water to use   Add equal amounts of liquid concentrate and formula to the bottle   Shake well prior to feeding   For pre-mixed formula - Refrigerate and use within 24 hours.  Re-warm individual bottles immediately prior to use   For formula remaining in the can, cover and refrigerate until needed.  Use within 48 hours   Formula expires 1 hour after in initiation of the feeding    Preparing Ready to Feed Formula:   Shake container well prior to opening   Pour enough formula for 1 feeding into a clean bottle   Do not add water or any other liquid   Attach nipple and cap   Shake well prior to feeding   Feed  immediately   For pre-mixed formula - Refrigerate and use within 24 hours.  Re-warm individual bottles immediately prior to use   For formula remaining in the can, cover and refrigerate until needed.  Use within 48 hours   Formula expires 1 hour after in initiation of the feeding  Cleaning and sterilization of equipment for formula preparation:   Clean and disinfect working surface   Wash hands, arms and under fingernails with soap and water; dry using a clean cloth   Use bottle/nipple brush to wash all bottles, nipples, rings, caps and preparation utensils in hot soapy water before initial use and rinse   Sterilize all parts/utensils in boiling water or with a sterilization device prior to use   Continue to wash all parts with warm soapy water and rinse after each use and sterilize daily  Appropriate storage of formula if more than 1 bottle is prepared:   Put a clean nipple right side up on the bottle and cover with a nipple cap   Label each bottle with the date and time prepared   Refrigerate until feeding time   Warm immediately prior to use by a bottle warmer or by running under warm water   Do NOT microwave bottles   For formula remaining in the can, cover and refrigerate until needed.  Use within 48 hours   Formula expires 1 hour after in initiation of the feeding  Safe formula feeding, preparation and transporting of pre-mixed feedings:   Always use thoroughly cleaned and sterilized BPA free bottles   Formula & water preference to be determined by the advice of the pediatrician   Use proper hand washing   Follow all s guidelines for preparing formula   Check all expiration dates   Clean all can tops with soap and water prior to opening; also use a clean can opener   All mixed formula should be refrigerated until immediately prior to transport   Transport in a cool insulated bag with ice packs and use within 2 hours or re-refrigerate at arrival destination   Re-warm feeding  at the destination for no longer than 15 minutes      Community Resources     Women, Infants, and Children Nutrition Program   Provides free breastfeeding education, counseling, food coupons, and breast pumps for eligible women. Breastfeeding counseling is provided by peer counselors and mother-to-mother support.      [phone]   wisheworks.[employer].gov    [facility] Connects moms, babies, and families in [place] to free help, pregnancy resources, and information about healthy behaviors pre- and . Available .  4-640-942-BABY   www.9311392flbb.org   info@1054613buhl.[facility]    [facility]ARS ([facility] Early Relationships Support & Services)   This program is for parents who have concerns about their baby's fussiness during the first year of life. Infant specialists work with you to find more ways to soothe, care for, and enjoy your baby.  [phone]   www.tbears.org   [email or web address] Provides preconception, pregnancy, and post discharge support through nutrition services, primary medical care for children, and many other services. Available on the phone and one-to-one.  [phone]   www.dcsno.org    AAPCC (Poison Control)   The American Association of Poison Control Centers supports the [place] [address] poison centers in their efforts to prevent and treat poison exposures. Poison centers offer free, confidential, expert medical advice 24 hours a day, seven days a week.  1-547.445.2148   www.aapcc.org/

## 2020-04-01 NOTE — ASSESSMENT & PLAN NOTE
Patient with cough and fever. No other respiratory symptoms outside of cough. % on room air. Patient does have some mild RLL infiltrate on CXR. Otherwise normal.   -Early mobilization.  -No need for oxygen  -Consider Abx if increase in WBC. No unreasonable to treat with Rocephin and Azithromycin if PNA suspected.

## 2020-04-01 NOTE — HPI
29 year female day 1 post delivery who is COVID positive.   Cough times 1 week.   Fever last night.   Reports no SOB.   Some lethargy that seems to have improved.   Denies chest pain.

## 2020-04-02 VITALS
RESPIRATION RATE: 20 BRPM | HEIGHT: 66 IN | WEIGHT: 271 LBS | OXYGEN SATURATION: 95 % | SYSTOLIC BLOOD PRESSURE: 119 MMHG | DIASTOLIC BLOOD PRESSURE: 65 MMHG | TEMPERATURE: 99 F | BODY MASS INDEX: 43.55 KG/M2 | HEART RATE: 99 BPM

## 2020-04-02 PROBLEM — O99.820 GBS (GROUP B STREPTOCOCCUS CARRIER), +RV CULTURE, CURRENTLY PREGNANT: Status: RESOLVED | Noted: 2020-03-20 | Resolved: 2020-04-02

## 2020-04-02 PROCEDURE — 25000003 PHARM REV CODE 250: Performed by: OBSTETRICS & GYNECOLOGY

## 2020-04-02 RX ORDER — HYDROCODONE BITARTRATE AND ACETAMINOPHEN 5; 325 MG/1; MG/1
1 TABLET ORAL EVERY 4 HOURS PRN
Qty: 12 TABLET | Refills: 0 | Status: SHIPPED | OUTPATIENT
Start: 2020-04-02

## 2020-04-02 RX ADMIN — GUAIFENESIN 600 MG: 600 TABLET, EXTENDED RELEASE ORAL at 07:04

## 2020-04-02 RX ADMIN — PRENATAL VIT W/ FE FUMARATE-FA TAB 27-0.8 MG 1 TABLET: 27-0.8 TAB at 07:04

## 2020-04-02 NOTE — DISCHARGE INSTRUCTIONS
After a Vaginal Birth    General Discharge Instructions    · May follow a regular diet, unless otherwise discussed with physician.    · Take showers, not baths unless otherwise discussed with physician.    · Activity as tolerated.    · No lifting or heavy exercise for 6 weeks, no driving for 2 weeks, no sexual intercourse, douching or tampons for 6 weeks    · May return to work/school as discussed with physician  · Take medications as directed    · Discuss birth control with physician    · Breast care support bra worn at all times    · Lactation consultant referral number ( [phone] or [phone])    Call Your Healthcare Provider Right Away If You Have:  · A temperature of 100.4°F or higher.  · If your blood pressure is over 155/105.  · You have difficulty catching your breath or trouble breathing.  · Heavy vaginal bleeding, clots, or vaginal discharge with foul odor. (heavier than menses)  · Persistent nausea or vomiting.  · You gain more than 3 pounds in 3 days.  · Severe headaches not relieved by Tylenol (acetaminophen) or Motrin (ibuprofen)  · Blurry or double vision, see spots or flashing lights.  · Dizziness or fainting.  · New onset swelling or worsening of existing swelling.  · Burning or pain when you urinate.  · No bowel movement for 5 days.  · Redness, warmth, swelling, or pain in the lower leg.  · Redness, discharge, or pain worse than you had in the hospital.  · Burning, pain, red streaks, or lumpy areas in your breasts.  · Cracks, blisters, or blood on your nipples.  · Feelings of extreme sadness or anxiety, or a feeling that you dont want to be with your baby.  · If you have any new or unusual symptoms or have questions or concerns    Some of these symptoms can occur up to 4 to 6 weeks after delivery. This can be a sign of pre-eclampsia, which is a serious disease that can cause stroke, seizures, organ damage, or death. Do not wait to call your doctor or seek medical attention.            If  You Had Stitches  You may have received stitches in the skin near your vagina. The stitches might have closed an episiotomy (an incision that enlarges the opening of the vagina). Or you may have needed stitches to repair torn skin. Either way, your stitches should dissolve within weeks. Until then, you can help reduce discomfort, aid healing, and reduce your risk of infection by keeping the stitches clean. These tips can help:    · Gently wipe from front to back after you urinate or have a bowel movement.  · After wiping, spray warm water on the area. Or you can have a sitz bath. This means sitting in a tub with a few inches of water in it. Then pat the area dry or use a hairdryer on a cool setting.  · You can take a shower instead of a bath.  · Change sanitary pads at least every 2-4 hours.  · Place cold or heat packs on the area as directed by your doctors or nurses. Keep a thin towel between the pack and your skin.  · Sit on firm seats so the stitches pull less.     Follow-Up  Schedule a  follow-up exam with your healthcare provider for about 6 weeks after delivery. During this exam, your uterus and vaginal area will be checked. Contact your healthcare provider if you think you or your baby are having any problems.

## 2020-04-02 NOTE — PLAN OF CARE
Patient progressing well, verbalizes readiness for discharge. IV removed per RN on previous shift. + COVID status, no fever today, nonproductive occasional cough present. Denies pain. Has passed gas. Remaining 6 feet away from infant due to +COVID 19. Bottle feeding q 3-4 hrs. Discharge instructions reviewed with patient, verbalizes understanding.

## 2020-04-02 NOTE — PLAN OF CARE
Pt progressing well. NAD noted well. VSS. Max temp 100.1. Pain well controlled. Pt has productive cough however denies SOB. POC discussed with pt. Understanding verbalized.

## 2020-04-02 NOTE — DISCHARGE SUMMARY
29 y.o.   OB History        3    Para   2    Term   2            AB   1    Living   2       SAB   1    TAB        Ectopic        Multiple   0    Live Births   2               admitted for Delivery. See procedure note. Postpartum course was unremarkable.     Admit date 3/31/2020  8:16 AM  D/c date 2020    Disposition: Home  Activity 6 weeks pelvic rest  Diet: normal  Discharge followup 1 week, if . Follow up in 6 weeks if vaginal delivery  Meds listed separately

## 2020-04-02 NOTE — PROGRESS NOTES
PPD# 2.  Doing well, no complaints. Denies h/a, vision changes, upper abd pain, chest pain, sob.    Patient Active Problem List   Diagnosis    MS (multiple sclerosis)    Herpes simplex virus (HSV) infection    GBS (group B Streptococcus carrier), +RV culture, currently pregnant    COVID-19 virus infection       Temp:  [98 °F (36.7 °C)-100.1 °F (37.8 °C)] 98.7 °F (37.1 °C)  Pulse:  [] 99  Resp:  [18-20] 20  SpO2:  [95 %-99 %] 95 %  BP: (119-124)/(65-76) 119/65  Alert and oriented  Lungs: Normal respiratory effort.  Cv: RRR  Abd soft fundus firm and nontender  Ext: No significant asymmetric edema, no calf tenderness.    Recent Labs   Lab 20  0944 20  0515   WBC  --  10.73   HGB  --  9.5*   HCT  --  30.8*   PLT  --  216   GROUPTRH A POS  --        A&P  29 y.o.  s/p , doing well from an ob standpoint.  COVID positive- appreciate pulmonary consult. Tmax 100.1 at 2300. No WBC drawn due to expected increase from .  Will dc today. Covid precautions discussed, including emergency warnings.

## 2020-04-04 LAB — BACTERIA BLD CULT: NORMAL

## 2020-04-14 NOTE — ANESTHESIA POSTPROCEDURE EVALUATION
Anesthesia Post Evaluation    Patient: Mesha Mario    Procedure(s) Performed: * No procedures listed *    Final Anesthesia Type: epidural    Patient location during evaluation: labor & delivery  Patient participation: Yes- Able to Participate  Level of consciousness: awake and alert  Post-procedure vital signs: reviewed and stable  Pain management: adequate  Airway patency: patent    PONV status at discharge: No PONV  Anesthetic complications: no      Cardiovascular status: hemodynamically stable  Respiratory status: unassisted and spontaneous ventilation  Hydration status: euvolemic  Follow-up not needed.            No case tracking events are documented in the log.      Pain/Srinivas Score: No data recorded

## 2021-05-12 NOTE — PLAN OF CARE
Pt voiding urine and pending to pass flatus. VSS. NAD. Vaginal bleed light. Fundus firm. Not breastpumping. Encouraged to ambulate. Reports intermittent weakness, remains febrile. Provided tylenol prn. Tachycardic noted less than 120bpm. Oxygen level saturations % on room air. Nonproductive cough with diminished breath sounds. Denies dyspnea. Remains away from infant. Infant in room. Significant other at bedside to care for infant. Patient does not wear mask properly, provided instruction, continues to move the mask. Significant other wears mask and wearing appropriately.    No

## 2021-10-21 ENCOUNTER — TELEPHONE (OUTPATIENT)
Dept: NEUROLOGY | Facility: CLINIC | Age: 31
End: 2021-10-21

## 2023-03-08 NOTE — TREATMENT PLAN
0822 Pt arrived to unit via wheelchair with complaints of  Ctx that started last night at 10pm. Instructed to change into gown.    0830 Pt requesting pain medication. Rating ctx pains 8/10 on pain scale. sve done (see flow sheet). Denies any leaking of fluid or bleeding. States she had nothing to eat or drink since yesterday evening. Will call Dr Cisneros for orders.    0925 Dr Cisneros made aware of pt's arrival and cervical change within 1 hours. pt now 4cm/ 80%-2. Rating ctx 10/10 now. requesting epidural.GBS positive. Orders given to admit and pt may have epidural when possible and start ampicillen per protocol for GBS. Pt made aware. Instructed to call sig other and make aware that she is admitted so that he can come up for support. Verbal recall. belongings transferred with pt. transferred to room 221via ambulation.     0950 Lab work collected and sent to lab. Anesthesia made aware of pt requesting epidural. Awaiting labs results.    1025 Dr Puckett at bedside. Pt sitting for epidural    1500 Dr Cisneros updated on pt's 9cm dilated. Room being set up for delivery.    N/A